# Patient Record
Sex: MALE | Race: WHITE | URBAN - METROPOLITAN AREA
[De-identification: names, ages, dates, MRNs, and addresses within clinical notes are randomized per-mention and may not be internally consistent; named-entity substitution may affect disease eponyms.]

---

## 2022-12-26 ENCOUNTER — EMERGENCY (EMERGENCY)
Facility: HOSPITAL | Age: 22
LOS: 0 days | Discharge: HOME | End: 2022-12-26
Attending: HOSPITALIST
Payer: COMMERCIAL

## 2022-12-26 VITALS
HEART RATE: 83 BPM | RESPIRATION RATE: 18 BRPM | OXYGEN SATURATION: 98 % | TEMPERATURE: 98 F | SYSTOLIC BLOOD PRESSURE: 120 MMHG | DIASTOLIC BLOOD PRESSURE: 74 MMHG

## 2022-12-26 DIAGNOSIS — S41.112A LACERATION WITHOUT FOREIGN BODY OF LEFT UPPER ARM, INITIAL ENCOUNTER: ICD-10-CM

## 2022-12-26 DIAGNOSIS — S61.412A LACERATION WITHOUT FOREIGN BODY OF LEFT HAND, INITIAL ENCOUNTER: ICD-10-CM

## 2022-12-26 DIAGNOSIS — S61.512A LACERATION WITHOUT FOREIGN BODY OF LEFT WRIST, INITIAL ENCOUNTER: ICD-10-CM
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
## 2022-12-26 PROCEDURE — 12004 RPR S/N/AX/GEN/TRK7.6-12.5CM: CPT

## 2022-12-26 PROCEDURE — 73090 X-RAY EXAM OF FOREARM: CPT | Mod: LT

## 2022-12-26 PROCEDURE — 73120 X-RAY EXAM OF HAND: CPT | Mod: 26,LT

## 2022-12-26 PROCEDURE — 99284 EMERGENCY DEPT VISIT MOD MDM: CPT | Mod: 25

## 2022-12-26 PROCEDURE — 73090 X-RAY EXAM OF FOREARM: CPT | Mod: 26,LT

## 2022-12-26 PROCEDURE — 73120 X-RAY EXAM OF HAND: CPT | Mod: LT

## 2022-12-26 NOTE — ED PROVIDER NOTE - NSFOLLOWUPINSTRUCTIONS_ED_ALL_ED_FT
Please keep wounds clean and dry.  Areas can get wet but please do not scrub the areas.  Please do not put any ointments on the areas.  Please keep the pressure dressing on (Ace wrap) for 1 day unless it is causing you significant pain and then remove it.  Please have your stitches removed in 10 to 14 days.    Take tylenol as needed for pain, 650Mg every 6-8 hours.  You can also take ibuprofen as needed for pain, 600mg every 6-8 hours, take with food.

## 2022-12-26 NOTE — ED PROVIDER NOTE - NS ED ROS FT
Constitutional: No fever or chills  Eyes: No visual changes  HEENT: No throat pain  CV: No chest pain  Resp: No SOB no cough  GI: No abd pain, nausea or vomiting  : No dysuria  MSK: No musculoskeletal pain  Skin: cuts to left hand and forearm  Neuro: No headache

## 2022-12-26 NOTE — ED ADULT NURSE NOTE - CHIEF COMPLAINT QUOTE
Patient presents to the ED with father s/p mirror falling on patient. When patient went to go catch the mirror broke cutting his left forearm, and wrist. Bleeding controlled in triage. Gauze and pressure bandage applied. Denies numbness to hand and finger

## 2022-12-26 NOTE — ED ADULT NURSE NOTE - OBJECTIVE STATEMENT
Patient presented to the ED w family c/o mirror falling on patient. Patient states mirror was falling and when he went to catch it, it broke and cut his left forearm. Lac noted to pts left forearm. Pt reports pain as a 5 out of 10 at this time. Patient does not appear to be in any distress at this time. Patient denies headstrike or loc. Pt denies numbness and tingling to left hand and arm.

## 2022-12-26 NOTE — ED PROVIDER NOTE - PHYSICAL EXAMINATION
***GEN - NAD; well appearing; A+O x3 ***HEAD - NC/AT ***EYES/NOSE - PERRL, EOMI, mucous membranes moist, no discharge ***THROAT: Oral cavity and pharynx normal. No inflammation, swelling, exudate, or lesions.  ***NECK: Neck supple  ***PULMONARY - CTA b/l, symmetric breath sounds. ***CARDIAC -s1s2, RRR, no M,G,R  ***ABDOMEN - +BS, ND, NT, soft, no guarding, no rebound, no masses   ***BACK - no CVA tenderness, Normal  spine ***EXTREMITIES - symmetric pulses, 2+ dp, capillary refill < 2 seconds ***SKIN - 3cm curvilinear lac to left themar eminence of left hand and zig-zag shaped lac to left forearm about 5cm long. also a tiny curved linear lac to left wrist.   ***NEUROLOGIC - alert, CN 2-12 intact. all sensation intact over left hand and forearm. ***GEN - NAD; well appearing; A+O x3 ***HEAD - NC/AT ***EYES/NOSE - PERRL, EOMI, mucous membranes moist, no discharge ***THROAT: Oral cavity and pharynx normal. No inflammation, swelling, exudate, or lesions.  ***NECK: Neck supple  ***PULMONARY - CTA b/l, symmetric breath sounds. ***CARDIAC -s1s2, RRR, no M,G,R  ***ABDOMEN - +BS, ND, NT, soft, no guarding, no rebound, no masses   ***BACK - no CVA tenderness, Normal  spine ***EXTREMITIES - symmetric pulses, 2+ dp, capillary refill < 2 seconds ***SKIN - 3cm curvilinear lac to left thenar eminence of left hand and zig-zag shaped lac to left forearm about 5cm long. also a tiny curved linear lac to left wrist.   ***NEUROLOGIC - alert, CN 2-12 intact. all sensation intact over left hand and forearm.

## 2022-12-26 NOTE — ED PROVIDER NOTE - CLINICAL SUMMARY MEDICAL DECISION MAKING FREE TEXT BOX
22M with cuts to his left hand, forearm and wrist after a mirror fell and broke at home. will image with xray for fb, clean and close. tdap utd.

## 2022-12-26 NOTE — ED ADULT TRIAGE NOTE - CHIEF COMPLAINT QUOTE
Patient presents to the ED with father s/p mirror falling on patient. When patient went to go catch the mirror broke cutting his left forearm, and palm of hand. Bleeding controlled in triage. Patient presents to the ED with father s/p mirror falling on patient. When patient went to go catch the mirror broke cutting his left forearm, and wrist. Bleeding controlled in triage. Gauze and pressure bandage applied. Denies numbness to hand and finger

## 2022-12-26 NOTE — ED PROVIDER NOTE - OBJECTIVE STATEMENT
22 old male presents after bumping into a mirror that had no frame that was leaning on a chair.  The mirror fell and caught his left arm and hand in multiple places.  Came to the ED for evaluation.  Tetanus is up-to-date.  Patient is right-handed 22 old male presents after bumping into a mirror that had no frame that was leaning on a chair.  The mirror fell and caught his left arm and hand in multiple places.  pressure dressing applied at home. Came to the ED for evaluation.  Tetanus is up-to-date.  Patient is right-handed.

## 2022-12-26 NOTE — ED PROVIDER NOTE - PATIENT PORTAL LINK FT
You can access the FollowMyHealth Patient Portal offered by [facility] by registering at the following website: [email or web address]. By joining [employer]’s FollowMyHealth portal, you will also be able to view your health information using other applications (apps) compatible with our system.